# Patient Record
Sex: MALE | Race: WHITE | NOT HISPANIC OR LATINO | Employment: OTHER | URBAN - METROPOLITAN AREA
[De-identification: names, ages, dates, MRNs, and addresses within clinical notes are randomized per-mention and may not be internally consistent; named-entity substitution may affect disease eponyms.]

---

## 2023-01-19 ENCOUNTER — HOSPITAL ENCOUNTER (EMERGENCY)
Facility: HOSPITAL | Age: 23
Discharge: HOME | End: 2023-01-19
Attending: EMERGENCY MEDICINE | Admitting: EMERGENCY MEDICINE
Payer: OTHER GOVERNMENT

## 2023-01-19 ENCOUNTER — APPOINTMENT (OUTPATIENT)
Dept: GENERAL RADIOLOGY | Facility: HOSPITAL | Age: 23
End: 2023-01-19
Payer: OTHER GOVERNMENT

## 2023-01-19 VITALS
HEIGHT: 67 IN | BODY MASS INDEX: 22.28 KG/M2 | SYSTOLIC BLOOD PRESSURE: 141 MMHG | DIASTOLIC BLOOD PRESSURE: 74 MMHG | TEMPERATURE: 98.1 F | HEART RATE: 67 BPM | WEIGHT: 141.98 LBS | RESPIRATION RATE: 16 BRPM | OXYGEN SATURATION: 97 %

## 2023-01-19 DIAGNOSIS — M77.8 RIGHT SHOULDER TENDONITIS: Primary | ICD-10-CM

## 2023-01-19 PROCEDURE — 99283 EMERGENCY DEPT VISIT LOW MDM: CPT

## 2023-01-19 PROCEDURE — 25010000002 KETOROLAC TROMETHAMINE PER 15 MG: Performed by: NURSE PRACTITIONER

## 2023-01-19 PROCEDURE — 73030 X-RAY EXAM OF SHOULDER: CPT

## 2023-01-19 PROCEDURE — 25010000002 DEXAMETHASONE PER 1 MG: Performed by: NURSE PRACTITIONER

## 2023-01-19 PROCEDURE — 96372 THER/PROPH/DIAG INJ SC/IM: CPT

## 2023-01-19 RX ORDER — KETOROLAC TROMETHAMINE 30 MG/ML
60 INJECTION, SOLUTION INTRAMUSCULAR; INTRAVENOUS ONCE
Status: COMPLETED | OUTPATIENT
Start: 2023-01-19 | End: 2023-01-19

## 2023-01-19 RX ORDER — DICLOFENAC SODIUM 75 MG/1
75 TABLET, DELAYED RELEASE ORAL 2 TIMES DAILY
Qty: 30 TABLET | Refills: 0 | Status: SHIPPED | OUTPATIENT
Start: 2023-01-19

## 2023-01-19 RX ORDER — PREDNISONE 50 MG/1
50 TABLET ORAL DAILY
Qty: 4 TABLET | Refills: 0 | Status: SHIPPED | OUTPATIENT
Start: 2023-01-19

## 2023-01-19 RX ORDER — ORPHENADRINE CITRATE 100 MG/1
100 TABLET, EXTENDED RELEASE ORAL 2 TIMES DAILY
Qty: 20 TABLET | Refills: 0 | Status: SHIPPED | OUTPATIENT
Start: 2023-01-19

## 2023-01-19 RX ADMIN — DEXAMETHASONE SODIUM PHOSPHATE 10 MG: 10 INJECTION, SOLUTION INTRAMUSCULAR; INTRAVENOUS at 05:25

## 2023-01-19 RX ADMIN — KETOROLAC TROMETHAMINE 60 MG: 30 INJECTION, SOLUTION INTRAMUSCULAR at 04:27

## 2023-01-19 NOTE — Clinical Note
Lab at bedside to draw 2nd set of blood cultures.      Earlene Dandy, RN  09/10/20 49[address] [facility] EMERGENCY ROOM  [address]  [place] [address]  Phone: [phone]    Delvin Leary was seen and treated in our emergency department on 1/19/2023.  He may return to work on 01/20/2023.         Thank you for choosing [facility].    Gifty Chowdhury APRN

## 2023-01-19 NOTE — DISCHARGE INSTRUCTIONS
Rest, gentle range of motion exercises followed by 20 minutes of ice several times a day.  Take your meds as prescribed.  You may take acetaminophen anytime throughout the day with these medications as needed for pain.  Call your primary care office on [address] today to follow-up with them next week so they may reexamine your shoulder and determine then if you will need any further imaging or physical therapy.  Return to the emergency department for any inability to flex or extend your shoulder, any increase in swelling redness or warmth, or any new or worse concerns.

## 2023-01-19 NOTE — ED PROVIDER NOTES
Time: 5:04 AM EST  Date of encounter:  1/19/2023  Independent Historian/Clinical History and Information was obtained by:   Patient  Chief Complaint: RIGHT SHOULDER PAIN    History is limited by: N/A    History of Present Illness:    The patient presents to the emergency department complaining of right shoulder discomfort.  He states that he worked out yesterday for the first time in about 6 months.  He states that he was little bit sore in his right shoulder afterwards felt okay.  He states that he got up yesterday to go for a run and states that after his run he became much more painful and states that he cannot lift any higher than about 45 degrees without becoming too uncomfortable.  There is no obvious swelling bruising redness or deformity noted on exam.  He does have full range of motion but reports increased pain.  He is neurovascular intact.  He states he has had tendinitis in that elbow and shoulder before but no injuries to the bones or any previous surgeries.      History provided by:  Patient   used: No        Patient Care Team  Primary Care Provider: Efrain Vigil APRN    Past Medical History:     No Known Allergies  History reviewed. No pertinent past medical history.  History reviewed. No pertinent surgical history.  History reviewed. No pertinent family history.    Home Medications:  Prior to Admission medications    Not on File        Social History:          Review of Systems:  Review of Systems   Constitutional: Negative for chills and fever.   HENT: Negative for congestion, ear pain and sore throat.    Eyes: Negative for pain.   Respiratory: Negative for cough, chest tightness and shortness of breath.    Cardiovascular: Negative for chest pain.   Gastrointestinal: Negative for abdominal pain, diarrhea, nausea and vomiting.   Genitourinary: Negative for dysuria, flank pain, hematuria and urgency.   Musculoskeletal: Positive for arthralgias and myalgias. Negative for back  "pain, joint swelling, neck pain and neck stiffness.   Skin: Negative for color change, pallor and rash.   Neurological: Negative for seizures and headaches.   All other systems reviewed and are negative.       Physical Exam:  /74 (BP Location: Left arm, Patient Position: Sitting)   Pulse 67   Temp 98.1 °F (36.7 °C) (Oral)   Resp 16   Ht 170.2 cm (67\")   Wt 64.4 kg (141 lb 15.6 oz)   SpO2 97%   BMI 22.24 kg/m²     Physical Exam  Vitals and nursing note reviewed.   Constitutional:       General: He is not in acute distress.     Appearance: Normal appearance. He is not ill-appearing or toxic-appearing.   HENT:      Head: Normocephalic and atraumatic.   Eyes:      General: No scleral icterus.     Conjunctiva/sclera: Conjunctivae normal.      Pupils: Pupils are equal, round, and reactive to light.   Cardiovascular:      Rate and Rhythm: Normal rate and regular rhythm.      Pulses: Normal pulses.   Pulmonary:      Effort: Pulmonary effort is normal. No respiratory distress.   Abdominal:      General: Abdomen is flat. There is no distension.   Musculoskeletal:         General: Tenderness and signs of injury present. No swelling or deformity. Normal range of motion.      Cervical back: Normal range of motion and neck supple. No rigidity or tenderness.   Lymphadenopathy:      Cervical: No cervical adenopathy.   Skin:     General: Skin is warm and dry.      Capillary Refill: Capillary refill takes less than 2 seconds.      Findings: No bruising, erythema or rash.   Neurological:      General: No focal deficit present.      Mental Status: He is alert and oriented to person, place, and time. Mental status is at baseline.   Psychiatric:         Mood and Affect: Mood normal.         Behavior: Behavior normal.                  Procedures:  Procedures      Medical Decision Making:      Comorbidities that affect care:    None    External Notes reviewed:    None      The following orders were placed and all results were " independently analyzed by me:  Orders Placed This Encounter   Procedures   • XR Shoulder 2+ View Right       Medications Given in the Emergency Department:  Medications   ketorolac (TORADOL) injection 60 mg (60 mg Intramuscular Given 1/19/23 8697)   dexamethasone (DECADRON) 10 MG/ML oral solution 10 mg (10 mg Oral Given 1/19/23 0305)        ED Course:         Labs:    Lab Results (last 24 hours)     ** No results found for the last 24 hours. **           Imaging:    XR Shoulder 2+ View Right    Result Date: 1/19/2023  PROCEDURE: XR SHOULDER 2+ VW RIGHT  COMPARISON: None.  INDICATIONS: RIGHT SHOULDER PAIN, SWELLING, TRISTIAN. POSTERIORLY; NO KNOWN RECENT INJURY.  FINDINGS: Four (4) views were obtained.  No acute fracture or acute malalignment is identified.  If symptoms or clinical concerns persist, consider imaging follow-up.       No acute fracture or acute malalignment is identified.     Please note that portions of this note were completed with a voice recognition program.  LATISHA SIERRA JR, MD       Electronically Signed and Approved By: LATISHA SIERRA JR, MD on 1/19/2023 at 5:03                  Differential Diagnosis and Discussion:    Joint Pain: Differential diagnosis includes but is not limited to polyarticular arthritis, gout, tendinitis, hemarthrosis, septic arthritis, rheumatoid arthritis, bursitis, degenerative joint disease, joint effusion, autoimmune disorder, trauma, and occult neoplasm.    All X-rays were independently reviewed by me.    MDM     Patient Care Considerations:    CT EXTREMITY: I considered ordering an extremity CT, however I discussed with the patient and we determined that he would follow-up for outpatient imaging should his symptoms not get better with conservative therapy.      Consultants/Shared Management Plan:    None    Social Determinants of Health:    Patient is independent, reliable, and has access to care.       Disposition and Care Coordination:    Discharged: The patient is  suitable and stable for discharge with no need for consideration of observation or admission.    I have explained the patient´s condition, diagnoses and treatment plan based on the information available to me at this time. I have answered questions and addressed any concerns. The patient has a good  understanding of the patient´s diagnosis, condition, and treatment plan as can be expected at this point. The vital signs have been stable. The patient´s condition is stable and appropriate for discharge from the emergency department.      The patient will pursue further outpatient evaluation with the primary care physician or other designated or consulting physician as outlined in the discharge instructions. They are agreeable to this plan of care and follow-up instructions have been explained in detail. The patient has received these instructions in written format and have expressed an understanding of the discharge instructions. The patient is aware that any significant change in condition or worsening of symptoms should prompt an immediate return to this or the closest emergency department or call to 911.    Final diagnoses:   Right shoulder tendonitis        ED Disposition     ED Disposition   Discharge    Condition   Stable    Comment   --             This medical record created using voice recognition software.           Gifty Chowdhury, JANNA  01/19/23 6363